# Patient Record
Sex: FEMALE | Race: BLACK OR AFRICAN AMERICAN | NOT HISPANIC OR LATINO
[De-identification: names, ages, dates, MRNs, and addresses within clinical notes are randomized per-mention and may not be internally consistent; named-entity substitution may affect disease eponyms.]

---

## 2019-09-16 PROBLEM — Z00.00 ENCOUNTER FOR PREVENTIVE HEALTH EXAMINATION: Status: ACTIVE | Noted: 2019-09-16

## 2019-10-18 ENCOUNTER — RESULT REVIEW (OUTPATIENT)
Age: 74
End: 2019-10-18

## 2019-10-18 ENCOUNTER — OUTPATIENT (OUTPATIENT)
Dept: OUTPATIENT SERVICES | Facility: HOSPITAL | Age: 74
LOS: 1 days | End: 2019-10-18
Payer: MEDICARE

## 2019-10-18 ENCOUNTER — APPOINTMENT (OUTPATIENT)
Dept: MAMMOGRAPHY | Facility: IMAGING CENTER | Age: 74
End: 2019-10-18
Payer: MEDICARE

## 2019-10-18 DIAGNOSIS — Z00.8 ENCOUNTER FOR OTHER GENERAL EXAMINATION: ICD-10-CM

## 2019-10-18 PROCEDURE — 77065 DX MAMMO INCL CAD UNI: CPT | Mod: 26,RT

## 2019-10-18 PROCEDURE — 19081 BX BREAST 1ST LESION STRTCTC: CPT

## 2019-10-18 PROCEDURE — 19081 BX BREAST 1ST LESION STRTCTC: CPT | Mod: RT

## 2019-10-18 PROCEDURE — 88305 TISSUE EXAM BY PATHOLOGIST: CPT | Mod: 26

## 2019-10-18 PROCEDURE — 77065 DX MAMMO INCL CAD UNI: CPT

## 2019-10-18 PROCEDURE — A4648: CPT

## 2019-10-18 PROCEDURE — 88305 TISSUE EXAM BY PATHOLOGIST: CPT

## 2022-12-07 ENCOUNTER — RESULT REVIEW (OUTPATIENT)
Age: 77
End: 2022-12-07

## 2022-12-07 ENCOUNTER — APPOINTMENT (OUTPATIENT)
Dept: NEUROLOGY | Facility: CLINIC | Age: 77
End: 2022-12-07

## 2022-12-07 VITALS
DIASTOLIC BLOOD PRESSURE: 81 MMHG | HEIGHT: 61 IN | HEART RATE: 76 BPM | BODY MASS INDEX: 25.3 KG/M2 | WEIGHT: 134 LBS | SYSTOLIC BLOOD PRESSURE: 178 MMHG

## 2022-12-07 DIAGNOSIS — M25.473 EFFUSION, UNSPECIFIED ANKLE: ICD-10-CM

## 2022-12-07 DIAGNOSIS — Z87.39 PERSONAL HISTORY OF OTHER DISEASES OF THE MUSCULOSKELETAL SYSTEM AND CONNECTIVE TISSUE: ICD-10-CM

## 2022-12-07 PROCEDURE — 99205 OFFICE O/P NEW HI 60 MIN: CPT

## 2022-12-07 RX ORDER — HYDROCHLOROTHIAZIDE 50 MG/1
50 TABLET ORAL
Qty: 90 | Refills: 0 | Status: ACTIVE | COMMUNITY
Start: 2022-11-28

## 2022-12-07 RX ORDER — METFORMIN ER 500 MG 500 MG/1
500 TABLET ORAL
Qty: 180 | Refills: 0 | Status: ACTIVE | COMMUNITY
Start: 2022-08-24

## 2022-12-07 RX ORDER — ATORVASTATIN CALCIUM 10 MG/1
10 TABLET, FILM COATED ORAL
Qty: 21 | Refills: 0 | Status: ACTIVE | COMMUNITY
Start: 2022-06-20

## 2022-12-07 RX ORDER — LOSARTAN POTASSIUM 100 MG/1
100 TABLET, FILM COATED ORAL
Qty: 90 | Refills: 0 | Status: ACTIVE | COMMUNITY
Start: 2022-11-28

## 2022-12-07 RX ORDER — AMLODIPINE BESYLATE 10 MG/1
10 TABLET ORAL
Qty: 90 | Refills: 0 | Status: ACTIVE | COMMUNITY
Start: 2022-07-18

## 2022-12-08 PROBLEM — M25.473 ANKLE SWELLING: Status: ACTIVE | Noted: 2022-12-08

## 2022-12-08 NOTE — PHYSICAL EXAM
[FreeTextEntry1] : Constitutional:  Patient was well-developed, well-nourished and seemed anxious\par \par Head:  Normocephalic, atraumatic. Tympanic membranes were clear. \par \par Neck:  Supple with full range of motion. \par \par Cardiovascular:  Cardiac rhythm was regular without murmur. There were no carotid bruits. Peripheral pulses were full and symmetric. \par \par Respiratory:  Lungs were clear. \par \par Abdomen:  Soft and nontender. \par \par Spine:  Nontender. \par \par Skin:  There were no rashes.  left foot mild-mod swelling of ankle w/ tenderness on palpation \par \par NEUROLOGICAL EXAMINATION:\par \par Mental Status: Patient was alert and oriented. Speech was fluent. There was no dysarthria. \par \par Cranial Nerves: \par \par II: Visual acuity was 20/ 25 -1 on the left and 20/25 on the right  bilaterally with near card. Pupils were equal but not reactive.  Visual fields were full. \par \par III, IV, VI:  Eye movements were full without nystagmus. \par \par V: Facial sensation was intact. \par \par VII: Facial strength was normal. \par \par VIII: Hearing was equal. \par \par IX, X: Palatal movement was normal. Phonation was normal. \par \par XI: Sternocleidomastoids and trapezii were normal. \par \par XII: Tongue was midline and movements normal. There was no lingual atrophy or fasciculations. \par \par Motor Examination: She was not able to extend or spread out her left toes. She had moderate-severe weakness with left  toe flexion. She was not able to dorsiflex the the left foot. She had strong left foot inversion and eversion.  Right foot motor strength was normal. She had strong hip flexion, knee extension, hip abduction and adduction. Upper motor exam was normal. She had mild decreased muscle bulk in her left  lower distal extremity compared to her right. \par \par Straight leg test: unremarkable \par \par Sensory Examination: Pinprick was decreased on the left lateral mid shin and left dorsal foot. Vibration also decreased on left foot.  The joint position sense were intact. \par \par Reflexes: DTRs were 2+ throughout except  there were no ankle  DTRs bilaterally. \par \par Plantar Responses: Plantar responses were flexor. \par \par Coordination/Cerebellar Function: There was no dysmetria on finger to nose or heel to shin testing. \par \par Gait/Stance: Left steppage gait. Unable to stand on heels but was able to stand on both toes. Very unsteady on heel-shin testing  Romberg was remarkable for mildly swaying \par

## 2022-12-08 NOTE — HISTORY OF PRESENT ILLNESS
[FreeTextEntry1] : COVID in 2021 w/ mild symptoms\par COVID VACCINE FULL.\par \par Nany Hart is a 77 year old right handed women accompanied by her daughter Mady ([phone]). She presents today with the chief complaint of the inability to move dorsiflex her foot.  This initially first started about a month and a half ago. At that that she was sitting and holding her grandchild for about an hour. When she went to stand up she had a lot of difficulty and feels as if she might have twisted her left foot because the ankle is swollen. This came on suddenly and at that time she had numbness more so in her first big toe and tingling of  her dorsal foot up to the lateral mid shin. After about two hours or so she was able to move and walk on her left foot but still unable to dorsiflex the foot up.  She denies associated neurological problems including headaches, lightheadedness, dizziness, nausea,vomiting, vision changes, tinnitus/hearing loss, difficulty swallowing, back pain, sob or chest pain. \par \par She does have a history of sciatica and vague back problems. She denies any falls or trauma.   She has a history of HTN, Diabetes, HLD  and cataract surgery of both eyes last year. Her PCP is Dr. Joshua Rayo. \par \par She is retired and worked as a dietician assistant. She is single with two children. Her mother has  a hx of hemorrhagic stroke and DVTS and  at age 78. Her children are alive and healthy. She sleeps well at night with no issues.  27-Nov-2018 02:17

## 2022-12-08 NOTE — CONSULT LETTER
[Dear  ___] : Dear  [unfilled], [Consult Letter:] : I had the pleasure of evaluating your patient, [unfilled]. [Please see my note below.] : Please see my note below. [Consult Closing:] : Thank you very much for allowing me to participate in the care of this patient.  If you have any questions, please do not hesitate to contact me. [Sincerely,] : Sincerely, [FreeTextEntry3] : Shelby Kwon. FNP-BC

## 2022-12-08 NOTE — ASSESSMENT
[FreeTextEntry1] : Nany Hart is a 77 year old right handed women accompanied by her daughter Mady ([phone]). She presents today with the the inability to dorsiflex her  left foot. This episode came on suddenly about a half and a month ago when she was sitting for an hour when she noticed she was not able to stand due to her left foot weakness. Since then she has this left steppage gait associated with paresthesias. Clinical exam is significant for motor weakness in the left distal extremity with sensory changes. Unclear etiology possibly in the setting of a peroneal  nerve injury vs lumbosacral neuropathy vs demyelinating etiologies. Will need to do EMG/NCS, MR lumbar spine. I recommend PT and splinting of the left ankle to prevent supination and an X ray due to the edema and tenderness on exam. \par \par Impression: Possible peroneal nerve injury vs lumbosacral neuropathy vs demyelinating etiologies\par \par Plan:\par [] MR lumbar spine\par [] EMG/NCS\par [] X ray left ankle \par [] neuropathy and metabolic labs\par [] PT\par [] Physical Medicine and Rehab for left foot splinting \par \par Communication via telephone and portal in place. The patient will call the office if there is any new neurological complaints and will seek emergent evaluation for concerning red flag symptoms discussed.\par

## 2023-01-03 ENCOUNTER — LABORATORY RESULT (OUTPATIENT)
Age: 78
End: 2023-01-03

## 2023-01-05 LAB
25(OH)D3 SERPL-MCNC: 13.6 NG/ML
ANA SER IF-ACNC: NEGATIVE
BASOPHILS # BLD AUTO: 0.06 K/UL
BASOPHILS NFR BLD AUTO: 1 %
CCP AB SER IA-ACNC: <8 UNITS
CHOLEST SERPL-MCNC: 191 MG/DL
CRP SERPL-MCNC: <3 MG/L
EOSINOPHIL # BLD AUTO: 0.09 K/UL
EOSINOPHIL NFR BLD AUTO: 1.5 %
ERYTHROCYTE [SEDIMENTATION RATE] IN BLOOD BY WESTERGREN METHOD: 59 MM/HR
ESTIMATED AVERAGE GLUCOSE: 137 MG/DL
FOLATE SERPL-MCNC: 9.6 NG/ML
FREE KAPPA URINE: 37.83 MG/L
FREE KAPPA/LAMDA RATIO: 13.23 RATIO
FREE LAMDA URINE: 2.86 MG/L
HBA1C MFR BLD HPLC: 6.4 %
HBV SURFACE AG SER QL: NONREACTIVE
HCT VFR BLD CALC: 42.1 %
HCV AB SER QL: NONREACTIVE
HCV S/CO RATIO: 0.1 S/CO
HDLC SERPL-MCNC: 73 MG/DL
HGB BLD-MCNC: 13 G/DL
HTLV I+II AB SER QL: NORMAL
IMM GRANULOCYTES NFR BLD AUTO: 0.2 %
INR PPP: 1.05 RATIO
LDLC SERPL CALC-MCNC: 98 MG/DL
LYMPHOCYTES # BLD AUTO: 2.63 K/UL
LYMPHOCYTES NFR BLD AUTO: 42.7 %
MAN DIFF?: NORMAL
MCHC RBC-ENTMCNC: 27 PG
MCHC RBC-ENTMCNC: 30.9 GM/DL
MCV RBC AUTO: 87.5 FL
MONOCYTES # BLD AUTO: 0.79 K/UL
MONOCYTES NFR BLD AUTO: 12.8 %
NEUTROPHILS # BLD AUTO: 2.58 K/UL
NEUTROPHILS NFR BLD AUTO: 41.8 %
NONHDLC SERPL-MCNC: 118 MG/DL
PLATELET # BLD AUTO: 363 K/UL
PT BLD: 12.2 SEC
RBC # BLD: 4.81 M/UL
RBC # FLD: 16.2 %
RF+CCP IGG SER-IMP: NEGATIVE
RHEUMATOID FACT SER QL: 19 IU/ML
T PALLIDUM AB SER QL IA: NEGATIVE
T3FREE SERPL-MCNC: 3 PG/ML
T4 FREE SERPL-MCNC: 1.4 NG/DL
TRIGL SERPL-MCNC: 97 MG/DL
VIT B12 SERPL-MCNC: 448 PG/ML
WBC # FLD AUTO: 6.16 K/UL

## 2023-01-09 LAB
ALBUMIN MFR SERPL ELPH: 52.6 %
ALBUMIN SERPL-MCNC: 4.2 G/DL
ALBUMIN/GLOB SERPL: 1.1 RATIO
ALBUPE: 20 %
ALPHA1 GLOB MFR SERPL ELPH: 3.9 %
ALPHA1 GLOB SERPL ELPH-MCNC: 0.3 G/DL
ALPHA1UPE: 31.4 %
ALPHA2 GLOB MFR SERPL ELPH: 11 %
ALPHA2 GLOB SERPL ELPH-MCNC: 0.9 G/DL
ALPHA2UPE: 20.6 %
ASIALO-GM1 ANTIBODIES, IGG/IGM: 17 IV
B-GLOBULIN MFR SERPL ELPH: 13.4 %
B-GLOBULIN SERPL ELPH-MCNC: 1.1 G/DL
BETAUPE: 18.4 %
COPPER SERPL-MCNC: 101 UG/DL
CREAT 24H UR-MCNC: NORMAL G/24 H
CREATININE UR (MAYO): 99 MG/DL
CRYOGLOB SERPL-MCNC: NEGATIVE
GAMMA GLOB FLD ELPH-MCNC: 1.5 G/DL
GAMMA GLOB MFR SERPL ELPH: 19.1 %
GAMMAUPE: 9.6 %
GD1A ANTIBODIES, IGG/IGM: NORMAL IV
GD1B ANTIBODIES, IGG/IGM: 7 IV
GM1 ANTIBODIES, IGG/IGM: 8 IV
GM2 ANTIBODIES, IGG/IGM: 15 IV
GQ1B ANTIBODIES, IGG/IGM: 6 IV
IGA 24H UR QL IFE: NORMAL
INTERPRETATION SERPL IEP-IMP: NORMAL
KAPPA LC 24H UR QL: NORMAL
PANTOTHENATE SERPLBLD-MCNC: 54.8 NG/ML
PROT PATTERN 24H UR ELPH-IMP: NORMAL
PROT SERPL-MCNC: 8 G/DL
PROT SERPL-MCNC: 8 G/DL
PROT UR-MCNC: 10 MG/DL
PROT UR-MCNC: 10 MG/DL
SPECIMEN VOL 24H UR: NORMAL ML
VIT B1 SERPL-MCNC: 85.4 NMOL/L
VIT B2 SERPL-MCNC: 187 UG/L
VIT B6 SERPL-MCNC: 4.9 UG/L

## 2023-01-10 LAB
ARSENIC BLD-MCNC: 2 UG/L
CADMIUM SERPL-MCNC: 0.7 UG/L
LEAD BLD-MCNC: 2.2 UG/DL
MERCURY BLD-MCNC: <1 UG/L

## 2023-01-11 LAB — MAG AB SER QL: NEGATIVE

## 2023-01-13 LAB — HLA-B27 RELATED AG QL: NEGATIVE

## 2023-01-28 ENCOUNTER — APPOINTMENT (OUTPATIENT)
Dept: MRI IMAGING | Facility: CLINIC | Age: 78
End: 2023-01-28
Payer: MEDICARE

## 2023-01-28 ENCOUNTER — APPOINTMENT (OUTPATIENT)
Dept: RADIOLOGY | Facility: CLINIC | Age: 78
End: 2023-01-28
Payer: MEDICARE

## 2023-01-28 ENCOUNTER — OUTPATIENT (OUTPATIENT)
Dept: OUTPATIENT SERVICES | Facility: HOSPITAL | Age: 78
LOS: 1 days | End: 2023-01-28
Payer: MEDICARE

## 2023-01-28 DIAGNOSIS — Z87.39 PERSONAL HISTORY OF OTHER DISEASES OF THE MUSCULOSKELETAL SYSTEM AND CONNECTIVE TISSUE: ICD-10-CM

## 2023-01-28 PROCEDURE — 73610 X-RAY EXAM OF ANKLE: CPT

## 2023-01-28 PROCEDURE — 73610 X-RAY EXAM OF ANKLE: CPT | Mod: 26,LT

## 2023-01-28 PROCEDURE — 72148 MRI LUMBAR SPINE W/O DYE: CPT

## 2023-01-28 PROCEDURE — 72148 MRI LUMBAR SPINE W/O DYE: CPT | Mod: 26

## 2023-02-07 ENCOUNTER — NON-APPOINTMENT (OUTPATIENT)
Age: 78
End: 2023-02-07

## 2023-02-10 LAB — SENSORIMOTOR NEUROPATHY PROFILE W/ RECOMBX: NORMAL

## 2023-03-23 ENCOUNTER — APPOINTMENT (OUTPATIENT)
Dept: NEUROLOGY | Facility: CLINIC | Age: 78
End: 2023-03-23
Payer: MEDICARE

## 2023-03-23 PROCEDURE — 95910 NRV CNDJ TEST 7-8 STUDIES: CPT

## 2023-03-23 PROCEDURE — 95886 MUSC TEST DONE W/N TEST COMP: CPT

## 2023-03-23 PROCEDURE — 95885 MUSC TST DONE W/NERV TST LIM: CPT | Mod: 59

## 2024-04-23 ENCOUNTER — APPOINTMENT (OUTPATIENT)
Dept: MAMMOGRAPHY | Facility: CLINIC | Age: 79
End: 2024-04-23
Payer: MEDICARE

## 2024-04-23 PROCEDURE — 77063 BREAST TOMOSYNTHESIS BI: CPT

## 2024-04-23 PROCEDURE — 77067 SCR MAMMO BI INCL CAD: CPT

## 2024-05-15 ENCOUNTER — APPOINTMENT (OUTPATIENT)
Dept: RADIOLOGY | Facility: CLINIC | Age: 79
End: 2024-05-15

## 2024-06-06 ENCOUNTER — APPOINTMENT (OUTPATIENT)
Dept: RADIOLOGY | Facility: CLINIC | Age: 79
End: 2024-06-06
Payer: MEDICARE

## 2024-06-06 PROCEDURE — 77080 DXA BONE DENSITY AXIAL: CPT

## 2024-09-18 ENCOUNTER — EMERGENCY (EMERGENCY)
Facility: HOSPITAL | Age: 79
LOS: 0 days | Discharge: HOME | End: 2024-09-18
Attending: EMERGENCY MEDICINE
Payer: MEDICARE

## 2024-09-18 VITALS
SYSTOLIC BLOOD PRESSURE: 174 MMHG | RESPIRATION RATE: 16 BRPM | OXYGEN SATURATION: 98 % | DIASTOLIC BLOOD PRESSURE: 76 MMHG | WEIGHT: 145.06 LBS | TEMPERATURE: 98 F | HEART RATE: 86 BPM | HEIGHT: 63 IN

## 2024-09-18 VITALS
DIASTOLIC BLOOD PRESSURE: 79 MMHG | RESPIRATION RATE: 17 BRPM | TEMPERATURE: 98 F | SYSTOLIC BLOOD PRESSURE: 149 MMHG | HEART RATE: 56 BPM | OXYGEN SATURATION: 96 %

## 2024-09-18 DIAGNOSIS — S01.112A LACERATION WITHOUT FOREIGN BODY OF LEFT EYELID AND PERIOCULAR AREA, INITIAL ENCOUNTER: ICD-10-CM

## 2024-09-18 DIAGNOSIS — M79.644 PAIN IN RIGHT FINGER(S): ICD-10-CM

## 2024-09-18 DIAGNOSIS — E11.9 TYPE 2 DIABETES MELLITUS WITHOUT COMPLICATIONS: ICD-10-CM

## 2024-09-18 DIAGNOSIS — M25.562 PAIN IN LEFT KNEE: ICD-10-CM
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
## 2024-09-18 DIAGNOSIS — M79.602 PAIN IN LEFT ARM: ICD-10-CM
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
## 2024-09-18 DIAGNOSIS — E78.5 HYPERLIPIDEMIA, UNSPECIFIED: ICD-10-CM

## 2024-09-18 DIAGNOSIS — I10 ESSENTIAL (PRIMARY) HYPERTENSION: ICD-10-CM

## 2024-09-18 DIAGNOSIS — Z23 ENCOUNTER FOR IMMUNIZATION: ICD-10-CM

## 2024-09-18 PROCEDURE — 99284 EMERGENCY DEPT VISIT MOD MDM: CPT

## 2024-09-18 PROCEDURE — 73090 X-RAY EXAM OF FOREARM: CPT | Mod: 26,LT

## 2024-09-18 PROCEDURE — 72125 CT NECK SPINE W/O DYE: CPT | Mod: 26,MC

## 2024-09-18 PROCEDURE — 73080 X-RAY EXAM OF ELBOW: CPT | Mod: 26,LT

## 2024-09-18 PROCEDURE — 70450 CT HEAD/BRAIN W/O DYE: CPT | Mod: 26,MC

## 2024-09-18 PROCEDURE — 73564 X-RAY EXAM KNEE 4 OR MORE: CPT | Mod: 26,LT

## 2024-09-18 PROCEDURE — 70486 CT MAXILLOFACIAL W/O DYE: CPT | Mod: 26,MC

## 2024-09-18 PROCEDURE — 73140 X-RAY EXAM OF FINGER(S): CPT | Mod: 26,LT,76

## 2024-09-18 RX ORDER — ACETAMINOPHEN 325 MG/1
650 TABLET ORAL ONCE
Refills: 0 | Status: COMPLETED | OUTPATIENT
Start: 2024-09-18 | End: 2024-09-18

## 2024-09-18 RX ORDER — TETANUS TOXOID, REDUCED DIPHTHERIA TOXOID AND ACELLULAR PERTUSSIS VACCINE, ADSORBED 5; 2.5; 8; 8; 2.5 [IU]/.5ML; [IU]/.5ML; UG/.5ML; UG/.5ML; UG/.5ML
0.5 SUSPENSION INTRAMUSCULAR ONCE
Refills: 0 | Status: COMPLETED | OUTPATIENT
Start: 2024-09-18 | End: 2024-09-18

## 2024-09-18 RX ORDER — BACITRACIN 500 UNIT/G
1 OINTMENT (GRAM) TOPICAL ONCE
Refills: 0 | Status: COMPLETED | OUTPATIENT
Start: 2024-09-18 | End: 2024-09-18

## 2024-09-18 RX ADMIN — ACETAMINOPHEN 650 MILLIGRAM(S): 325 TABLET ORAL at 20:41

## 2024-09-18 RX ADMIN — TETANUS TOXOID, REDUCED DIPHTHERIA TOXOID AND ACELLULAR PERTUSSIS VACCINE, ADSORBED 0.5 MILLILITER(S): 5; 2.5; 8; 8; 2.5 SUSPENSION INTRAMUSCULAR at 20:41

## 2024-09-18 RX ADMIN — Medication 1 APPLICATION(S): at 20:42

## 2024-09-18 NOTE — ED ADULT TRIAGE NOTE - CHIEF COMPLAINT QUOTE
C/o left sided blurry vision, R periorbital hematoma, left knee pain, left arm abrasion, left cheek abrasion, R thumbnail pain s/p mechanical trip and fall on tree root today. Patient takes ASA daily. Unable to recall last tetanus immunization. Denies dizziness prior to fall, denies loc stated

## 2024-09-18 NOTE — ED PROVIDER NOTE - CARE PLAN
1 Principal Discharge DX:	Head injury  Secondary Diagnosis:	Left arm pain  Secondary Diagnosis:	Left knee pain

## 2024-09-18 NOTE — ED PROVIDER NOTE - OBJECTIVE STATEMENT
At about 5pm today, the patient tripped and fell over uneven sidewalk in front of her home. She hit the left side of her head and left arm and is having pain there and the left knee, left fifth digit and right thumb.

## 2024-09-18 NOTE — ED PROVIDER NOTE - PATIENT PORTAL LINK FT
You can access the FollowMyHealth Patient Portal offered by [facility] by registering at the following website: [email or web address]. By joining [employer]’s FollowMyHealth portal, you will also be able to view your health information using other applications (apps) compatible with our system.

## 2024-09-18 NOTE — ED ADULT NURSE NOTE - NSFALLHARMRISKINTERV_ED_ALL_ED
Assistance OOB with selected safe patient handling equipment if applicable/Assistance with ambulation/Communicate risk of Fall with Harm to all staff, patient, and family/Monitor gait and stability/Provide visual cue: red socks, yellow wristband, yellow gown, etc/Reinforce activity limits and safety measures with patient and family/Bed in lowest position, wheels locked, appropriate side rails in place/Call bell, personal items and telephone in reach/Instruct patient to call for assistance before getting out of bed/chair/stretcher/Non-slip footwear applied when patient is off stretcher/Liberal to call system/Physically safe environment - no spills, clutter or unnecessary equipment/Purposeful Proactive Rounding/Room/bathroom lighting operational, light cord in reach

## 2024-09-18 NOTE — ED PROVIDER NOTE - ATTENDING APP SHARED VISIT CONTRIBUTION OF CARE
I performed the initial evaluation of the patient and formulated the treatment plan. I entered orders and completed most sections of this note.

## 2024-09-18 NOTE — ED PROVIDER NOTE - SKIN, MLM
Skin normal color for race, warm, dry. Abrasion and swelling lateral to left orbit, on left forehead, and left superior eyelid.

## 2024-09-18 NOTE — ED PROVIDER NOTE - PROGRESS NOTE DETAILS
JONATHAN Roberson: Workup reviewed - XRAY's w/ no obvious fractures/dislocations. CT Head w/ No acute intracranial hemorrhage, mass effect or acute displaced calvarium fracture. CT Maxillofacial w/ Air-fluid level in the left maxillary sinus with mucosal wall thickening without acute displaced facial bone fracture visualized. Subtle occult fracture should be excluded on clinical grounds - there is no left maxillary point tenderness w/ palpation. Marked left periorbital soft tissue swelling or hematoma containing trace emphysema. CT Cervical Spine w/ No acute cervical spine fracture, subluxation or evidence of traumatic   malalignment.. Results endorsed including unexpected incidental findings (copy of reports provided to patient). Shared Decision Making - Reassessment performed, pt comfortable and in no acute distress, able to ambulate without any difficulty prior to DC home, pt w/ left-sided face abrasions of forehead and left cheek area which I copiously irrigated the area with and applied Bacitracin. Pt w/ small 0.5 upper eyelid laceration w/ minimal gaping, pt declined repair w/ sutures, applied steri-strips to the area w/ good approximation. Patient is medically stable for discharge. Strict return precautions given, discussed red flag signs/symptoms. Patient to follow up with PMD. Patient/parent displays understanding and agreeable with plan, comfortable with discharge plan home. Plan for discharge discussed with Dr. Samuel who agrees with disposition and discharge plan. YARELI Roberson: Workup reviewed - XRAY's w/ no obvious fractures/dislocations. CT Head w/ No acute intracranial hemorrhage, mass effect or acute displaced calvarium fracture. CT Maxillofacial w/ Air-fluid level in the left maxillary sinus with mucosal wall thickening without acute displaced facial bone fracture visualized. Subtle occult fracture should be excluded on clinical grounds - there is no left maxillary point tenderness w/ palpation. Marked left periorbital soft tissue swelling or hematoma containing trace emphysema. CT Cervical Spine w/ No acute cervical spine fracture, subluxation or evidence of traumatic   malalignment.. Results endorsed including unexpected incidental findings (copy of reports provided to patient). Shared Decision Making - Reassessment performed, pt comfortable and in no acute distress, able to ambulate without any difficulty prior to DC home, pt w/ left-sided face abrasions of forehead and left cheek area which I copiously irrigated the area with and applied Bacitracin. Pt w/ small 0.5 upper eyelid laceration w/ minimal gaping, pt declined repair w/ sutures, applied steri-strips to the area w/ good approximation. Patient is medically stable for discharge. Strict return precautions given, discussed red flag signs/symptoms. Patient to follow up with PMD. Patient/parent displays understanding and agreeable with plan, comfortable with discharge plan home. Plan for discharge discussed with Dr. Samuel who agrees with disposition and discharge plan.

## 2024-09-18 NOTE — ED PROVIDER NOTE - NSFOLLOWUPINSTRUCTIONS_ED_ALL_ED_FT
Plan: This patient has been treated today with image guided superficial radiation therapy for non-melanoma skin cancer. Written informed consent has been previously obtained from this patient for this treatment. This consent is documented in the patient’s chart. The patient gave verbal consent to continue treatment today. The patient was treated with a specific radiation dose and setup as prescribed by the provider listed on this visit note. A Radiation Therapist performed administration of radiation under supervision of provider. The treatment parameters and cumulative dose are indicated above. Prior to administering the radiation, the patient underwent a verification therapeutic radiology simulation-aided field setting defining relevant normal and abnormal target anatomy and acquiring images with high frequency ultrasound in addition to data necessary developing optimal radiation treatment process for the patient. This process includes verification of the treatment port(s) and proper treatment positioning. All treatment ports were photographed within electronic medical record. The patient’s customized lead blocking along with gross tumor volume and margin was confirmed. Considering superficial radiotherapy is clinical in setup, this requires physician and radiation therapist to clarify location interest being treated against initial images, pathology and patient anatomy. Care was taken ensuring fields treated were geometrically accurate and properly positioned using therapeutic radiology simulation-aided field setting verification per fraction. This process is also utilized to determine if any prescription or setup changes are necessary. These steps are therefore medically necessary ensuring safe and effective administration of radiation. Ongoing therapeutic radiology simulation-aided field setting verification is ordered throughout course of therapy.\\nA high frequency ultrasound image was acquired today for two-dimensional evaluation of the tumor volume and response to treatment, in addition to geometric accuracy of field placement. \\n\\nUS depth is 1.30 mm, which is 0.34 mm in difference from previous imaging. The field placement and ultrasound imaging, per fraction, is separate and distinct from the initial simulation, and is an important task in providing safe administration of superficial radiation therapy. Physician evaluation of the ultrasound tumor depth will be ongoing throughout the course of treatment, and is deemed medically necessary in order to ensure the efficacy of treatment and any necessary changes. Today’s image was evaluated for determination of continuation of treatment with the current plan or with necessary changes as appropriate. According to provider review of verification therapeutic radiology simulation-aided field setting and imaging, no change is required. Additionally, the use of ultrasound visualization and targeted assessment allows the patient to be able to see their cancer(s) progress, encouraging patient to complete and maintain compliance through full course of radiotherapy. Per Dr. Villagomez, continued ultrasound guidance and therapeutic radiology simulation-aided field setting verification per fraction is required for field placement, measurement of tumor depth, progress and acute effect monitoring. \\n Detail Level: Zone Follow-up with your Primary Care Physician within the next week.    Medications  - Take Tylenol (Acetaminophen) 650 mg every 4-6 hours AND/OR Motrin (Ibuprofen) 600 mg every 6-8 hours as needed for pain/fever.    Advance activity as tolerated.  Continue all previously prescribed medications as directed unless otherwise instructed.  Follow up with your primary care physician in 48-72 hours- bring copies of your results.  Return to the ER for worsening or persistent symptoms, and/or ANY NEW OR CONCERNING SYMPTOMS such as fever, chest pain, shortness of breath, abdominal pain, or headaches. If you have issues obtaining follow up, please call: [phone] to obtain a doctor or specialist who takes your insurance in your area.  You may call [phone] to make an appointment with the internal medicine clinic.  Head Injury, Adult       There are many types of head injuries. Head injuries can be as minor as a small bump, or they can be a serious medical issue. More severe head injuries include:    A jarring injury to the brain (concussion).  A bruise (contusion) of the brain. This means there is bleeding in the brain that can cause swelling.  A cracked skull (skull fracture).  Bleeding in the brain that collects, clots, and forms a bump (hematoma).    After a head injury, most problems occur within the first 24 hours, but side effects may occur up to 7–10 days after the injury. It is important to watch your condition for any changes. You may need to be observed in the emergency department or urgent care, or you may be admitted to the hospital.    What are the causes?  There are many possible causes of a head injury. Serious head injuries may be caused by car accidents, bicycle or motorcycle accidents, sports injuries, falls, or being struck by an object.    What are the symptoms?  Symptoms of a head injury include a contusion, bump, or bleeding at the site of the injury. Other physical symptoms may include:    Headache.  Nausea or vomiting.  Dizziness.  Blurred or double vision.  Being uncomfortable around bright lights or loud noises.  Seizures.  Feeling tired.  Trouble being awakened.  Loss of consciousness.    Mental or emotional symptoms may include:    Irritability.  Confusion and memory problems.  Poor attention and concentration.  Changes in eating or sleeping habits.  Anxiety or depression.    How is this diagnosed?  This condition can usually be diagnosed based on your symptoms, a description of the injury, and a physical exam. You may also have imaging tests done, such as a CT scan or an MRI.    How is this treated?  Treatment for this condition depends on the severity and type of injury you have. The main goal of treatment is to prevent complications and allow the brain time to heal.        Mild head injury    If you have a mild head injury, you may be sent home, and treatment may include:    Observation. A responsible adult should stay with you for 24 hours after your injury and check on you often.  Physical rest.  Brain rest.  Pain medicines.        Severe head injury    If you have a severe head injury, treatment may include:    Close observation. This includes hospitalization with the following care:    Frequent physical exams.  Frequent checks of how your brain and nervous system are working (neurological status).  Checking your blood pressure and oxygen levels.  Medicines to relieve pain, prevent seizures, and decrease brain swelling.  Airway protection and breathing support. This may include using a ventilator.  Treatments that monitor and manage swelling inside the brain.  Brain surgery. This may be needed to:    Remove a collection of blood or blood clots.  Stop the bleeding.  Remove a part of the skull to allow room for the brain to swell.    Follow these instructions at home:      Activity    Rest and avoid activities that are physically hard or tiring.  Make sure you get enough sleep.  Let your brain rest by limiting activities that require a lot of thought or attention, such as:    Watching TV.  Playing memory games and puzzles.  Job-related work or homework.  Working on the computer, using social media, and texting.  Avoid activities that could cause another head injury, such as playing sports, until your health care provider approves. Having another head injury, especially before the first one has healed, can be dangerous.  Ask your health care provider when it is safe for you to return to your regular activities, including work or school. Ask your health care provider for a step-by-step plan for gradually returning to activities.  Ask your health care provider when you can drive, ride a bicycle, or use heavy machinery. Your ability to react may be slower after a brain injury. Do not do these activities if you are dizzy.        Lifestyle     Do not drink alcohol until your health care provider approves. Do not use drugs. Alcohol and certain drugs may slow your recovery and can put you at risk of further injury.  If it is harder than usual to remember things, write them down.  If you are easily distracted, try to do one thing at a time.  Talk with family members or close friends when making important decisions.  Tell your friends, family, a trusted colleague, and  about your injury, symptoms, and restrictions. Have them watch for any new or worsening problems.        General instructions    Take over-the-counter and prescription medicines only as told by your health care provider.  Have someone stay with you for 24 hours after your head injury. This person should watch you for any changes in your symptoms and be ready to seek medical help.  Keep all follow-up visits as told by your health care provider. This is important.    How is this prevented?  Work on improving your balance and strength to avoid falls.  Wear a seat belt when you are in a moving vehicle.  Wear a helmet when riding a bicycle, skiing, or doing any other sport or activity that has a risk of injury.  If you drink alcohol:    Limit how much you use to:    0–1 drink a day for nonpregnant women.  0–2 drinks a day for men.  Be aware of how much alcohol is in your drink. In the [place] one drink equals one 12 oz bottle of beer (355 mL), one 5 oz glass of wine (148 mL), or one 1½ oz glass of hard liquor (44 mL).  Take safety measures in your home, such as:    Removing clutter and tripping hazards from floors and stairways.  Using grab bars in bathrooms and handrails by stairs.  Placing non-slip mats on floors and in bathtubs.  Improving lighting in dim areas.    Where to find more information  Centers for Disease Control and Prevention: www.cdc.gov    Get help right away if:  You have:    A severe headache that is not helped by medicine.  Trouble walking or weakness in your arms and legs.  Clear or bloody fluid coming from your nose or ears.  Changes in your vision.  A seizure.  Increased confusion or irritability.  Your symptoms get worse.  You are sleepier than normal and have trouble staying awake.  You lose your balance.  Your pupils change size.  Your speech is slurred.  Your dizziness gets worse.  You vomit.    These symptoms may represent a serious problem that is an emergency. Do not wait to see if the symptoms will go away. Get medical help right away. Call your local emergency services (911 in the [place] Do not drive yourself to the hospital.    Summary  Head injuries can be minor, or they can be a serious medical issue requiring immediate attention.  Treatment for this condition depends on the severity and type of injury you have.  Have someone stay with you for 24 hours after your injury and check on you often.  Ask your health care provider when it is safe for you to return to your regular activities, including work or school.  Head injury prevention includes wearing a seat belt in a motor vehicle, using a helmet on a bicycle, limiting alcohol use, and taking safety measures in your home.    ADDITIONAL NOTES AND INSTRUCTIONS    Please follow up with your Primary MD in 24-48 hr.  Seek immediate medical care for any new/worsening signs or symptoms.

## 2024-09-18 NOTE — ED ADULT NURSE NOTE - OBJECTIVE STATEMENT
pt a&ox4, ambulatory with some assistance. Pt presents today s/p fall, stumbled over sidewalk and fell into the root. Presenting with a swollen with hematoma on L side of eye, R thumb swollen and pain on L knee, 6/10. Reports h/a. denies blurry vision & lightheaedness or loose teeth,  No active bleeding att. Reports taking baby ASA daily. PMH - htn, borderline DM.

## 2024-09-18 NOTE — ED PROVIDER NOTE - MUSCULOSKELETAL, MLM
Spine appears normal and there is no c/t/l spinal tenderness, pelvis stable, range of motion is not limited. Tenderness of left face, left elbow and proximal forearm, distal left 5th finger, and right thumb.

## 2024-09-18 NOTE — ED PROVIDER NOTE - CLINICAL SUMMARY MEDICAL DECISION MAKING FREE TEXT BOX
78F c/o fall  -ct head. maxfac, c-spine  -xrays L elbow, forearm, 5th finger, knee and R thumb  -analgesia  -tdap 78F c/o fall  -ct head. maxfac, c-spine  -xrays L elbow, forearm, 5th finger, knee and R thumb  -analgesia  -tdap    RW JONATHAN Roberson: 78F w/ PMH Pre-DM2, HTN, HLD who presents to ED w/  left-sided face pain / swelling and LUE/LT knee pain s/p mechanical trip and fall. On PE - VSS, pt in no acute distress, + left-sided face abrasions/swelling to the left orbit, left forehead, and small 0,5 left superior eyelid laceration, no active bleeding. CT Head/Maxillofacial/Cervical Spine and XRAY's of LUE, LT Knee, RT Hand ordered by Dr. Samuel.    Workup reviewed - XRAY's w/ no obvious fractures/dislocations. CT Head w/ No acute intracranial hemorrhage, mass effect or acute displaced calvarium fracture. CT Maxillofacial w/ Air-fluid level in the left maxillary sinus with mucosal wall thickening without acute displaced facial bone fracture visualized. Subtle occult fracture should be excluded on clinical grounds - there is no left maxillary point tenderness w/ palpation. Marked left periorbital soft tissue swelling or hematoma containing trace emphysema. CT Cervical Spine w/ No acute cervical spine fracture, subluxation or evidence of traumatic   malalignment.. On reassessment, pt in no acute distress, copiously irrigated all affected areas of the face, applied Bacitracin to the left forehead/cheek abrasions, applied steri-strips to the left superior eyelid laceration since pt declining suturing at this time. Will DC home w/ medications for pain relief and discussed strict return precautions.

## 2024-09-18 NOTE — ED ADULT NURSE NOTE - CHIEF COMPLAINT QUOTE
C/o left sided blurry vision, R periorbital hematoma, left knee pain, left arm abrasion, left cheek abrasion, R thumbnail pain s/p mechanical trip and fall on tree root today. Patient takes ASA daily. Unable to recall last tetanus immunization. Denies dizziness prior to fall, denies loc

## 2024-09-19 ENCOUNTER — EMERGENCY (EMERGENCY)
Facility: HOSPITAL | Age: 79
LOS: 0 days | Discharge: HOME | End: 2024-09-19
Attending: STUDENT IN AN ORGANIZED HEALTH CARE EDUCATION/TRAINING PROGRAM
Payer: MEDICARE

## 2024-09-19 VITALS
SYSTOLIC BLOOD PRESSURE: 137 MMHG | HEIGHT: 63 IN | OXYGEN SATURATION: 97 % | WEIGHT: 147.05 LBS | TEMPERATURE: 98 F | DIASTOLIC BLOOD PRESSURE: 76 MMHG | RESPIRATION RATE: 18 BRPM | HEART RATE: 80 BPM
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
## 2024-09-19 DIAGNOSIS — S62.637A DISPLACED FRACTURE OF DISTAL PHALANX OF LEFT LITTLE FINGER, INITIAL ENCOUNTER FOR CLOSED FRACTURE: ICD-10-CM

## 2024-09-19 PROCEDURE — 26750 TREAT FINGER FRACTURE EACH: CPT | Mod: 54,F4

## 2024-09-19 PROCEDURE — 99284 EMERGENCY DEPT VISIT MOD MDM: CPT | Mod: 57

## 2024-09-19 RX ORDER — METFORMIN HYDROCHLORIDE 850 MG/1
1 TABLET, FILM COATED ORAL
Refills: 0 | DISCHARGE

## 2024-09-19 RX ORDER — AMLODIPINE BESYLATE 10 MG/1
1 TABLET ORAL
Refills: 0 | DISCHARGE

## 2024-09-19 RX ORDER — LOSARTAN POTASSIUM AND HYDROCHLOROTHIAZIDE 100; 25 MG/1; MG/1
1 TABLET, FILM COATED ORAL
Refills: 0 | DISCHARGE

## 2024-09-19 RX ORDER — ASPIRIN 81 MG
0 TABLET, DELAYED RELEASE (ENTERIC COATED) ORAL
Refills: 0 | DISCHARGE

## 2024-09-19 NOTE — ED PROVIDER NOTE - PHYSICAL EXAMINATION
VITAL SIGNS: I have reviewed nursing notes and confirm.  CONSTITUTIONAL: well-appearing, non-toxic, NAD  SKIN: Warm dry, normal skin turgor  HEAD: NC  EXT: left distal 5th finger tenderness, preserved ROM  NEURO: normal motor. normal sensory. Normal gait.  PSYCH: Cooperative, appropriate.

## 2024-09-19 NOTE — ED ADULT TRIAGE NOTE - WEIGHT IN KG
I spoke with the patient at length. I explained to her that she has very high risk lesions, and while they are not cancer as far as we can tell by the biopsy, the MRI is quite worrisome for underlying DCIS. We discussed her diagnosis of ALH/ADH/LCIS, and that utilizing patient education web based platforms such as [email or web address] , [email or web address], can help her understand why we are worried about her despite the fact that we did not find \"actual cancer\". I also gave her my home number and encouraged her to call to discuss.  We also discussed that her financial bill seems high despite having insurance, and thus I also reitterated the need to discuss her case with our financial team. I recommend at the minimum a follow up MRI in 3 months to ensure no growth of the area if she ultimately decides against additional biopsies.  [facility].     [facility] informed us that she called the patient to schedule the stereotactic biopsies, but the patient declined at this time.    66.7 normal...

## 2024-09-19 NOTE — ED PROVIDER NOTE - OBJECTIVE STATEMENT
70-year-old female presenting to the ED as a callback for distal phalanx tuft fracture of left fifth finger, patient reports minimal discomfort, preserved range of motion, will place in finger splint and advised patient to follow-up with outpatient hand surgery.

## 2024-09-19 NOTE — ED POST DISCHARGE NOTE - DETAILS
JONATHAN Arteaga: PT called and made aware of the results. Pt advised to get finger splint or return to the ED for splint and follow up with hand surgeon Dr Thurman. Information for plastic surgeon given to patient. All questions answered.

## 2024-09-19 NOTE — ED PROVIDER NOTE - CARE PROVIDER_API CALL
Tomas Thurman  Plastic Surgery  [address] [place] [address], [address]  [place] [address]  Phone: [phone]  Fax: [phone]  Follow Up Time: 4-6 Days

## 2024-09-19 NOTE — ED PROVIDER NOTE - CLINICAL SUMMARY MEDICAL DECISION MAKING FREE TEXT BOX
70-year-old female presenting to the ED as a callback for distal phalanx tuft fracture of left fifth finger, patient reports minimal discomfort, preserved range of motion, will place in finger splint and advised patient to follow-up with outpatient hand surgery.    Finger splint   f/u hand surgery 70-year-old female presenting to the ED as a callback for distal phalanx tuft fracture of left fifth finger, patient reports minimal discomfort, preserved range of motion, will place in finger splint and advised patient to follow-up with outpatient hand surgery.    Finger splint   f/u hand surgery    RW JONATHAN Arteaga: Pt called back for left distal phalanx tuft fracture of the left fifth finger. Pt placed in finger splint and follow up with hand surgeon

## 2024-09-19 NOTE — ED PROVIDER NOTE - PATIENT PORTAL LINK FT
You can access the FollowMyHealth Patient Portal offered by [facility] by registering at the following website: [email or web address]. By joining [facility]’s FollowMyHealth portal, you will also be able to view your health information using other applications (apps) compatible with our system.

## 2024-09-19 NOTE — ED ADULT NURSE NOTE - OBJECTIVE STATEMENT
78 yr old female AOx4. C/o left pinky pain/injury. Reports fall yesterday. Called back for hairline fracture noted in left pinky. 7/10 pain. No other compaints